# Patient Record
Sex: FEMALE | Race: WHITE | Employment: FULL TIME | URBAN - METROPOLITAN AREA
[De-identification: names, ages, dates, MRNs, and addresses within clinical notes are randomized per-mention and may not be internally consistent; named-entity substitution may affect disease eponyms.]

---

## 2022-08-01 PROBLEM — S00.83XA FACIAL CONTUSION, INITIAL ENCOUNTER: Status: ACTIVE | Noted: 2022-08-01

## 2022-08-01 PROBLEM — S00.81XA FACIAL ABRASION, INITIAL ENCOUNTER: Status: ACTIVE | Noted: 2022-08-01

## 2022-08-01 PROBLEM — R42 VERTIGO: Status: ACTIVE | Noted: 2022-08-01

## 2022-08-01 PROBLEM — S02.30XA ORBITAL FLOOR (BLOW-OUT) CLOSED FRACTURE (HCC): Status: ACTIVE | Noted: 2022-08-01

## 2022-10-19 ENCOUNTER — HOSPITAL ENCOUNTER (OUTPATIENT)
Dept: GENERAL RADIOLOGY | Age: 57
Discharge: HOME | End: 2022-10-19
Payer: COMMERCIAL

## 2022-10-19 DIAGNOSIS — N30.01 ACUTE CYSTITIS WITH HEMATURIA: ICD-10-CM

## 2022-10-19 PROCEDURE — 74018 RADEX ABDOMEN 1 VIEW: CPT

## 2022-11-22 ENCOUNTER — HOSPITAL ENCOUNTER (OUTPATIENT)
Dept: GENERAL RADIOLOGY | Age: 57
Discharge: HOME | End: 2022-11-22
Payer: COMMERCIAL

## 2022-11-22 DIAGNOSIS — N20.0 CALCULUS OF KIDNEY: ICD-10-CM

## 2022-11-22 PROCEDURE — 74018 RADEX ABDOMEN 1 VIEW: CPT

## 2022-12-06 ENCOUNTER — HOSPITAL ENCOUNTER (OUTPATIENT)
Dept: CT IMAGING | Age: 57
Discharge: HOME | End: 2022-12-06
Attending: PHYSICIAN ASSISTANT
Payer: COMMERCIAL

## 2022-12-06 DIAGNOSIS — R10.9 LEFT FLANK PAIN: ICD-10-CM

## 2022-12-06 DIAGNOSIS — N20.0 KIDNEY STONE: ICD-10-CM

## 2022-12-06 PROCEDURE — 74176 CT ABD & PELVIS W/O CONTRAST: CPT

## 2022-12-06 NOTE — PROGRESS NOTES
Ct with nonobstructive L kidney stones. No obstructive stones. Will discuss at follow up. It also shows mild diverticulitis. Sent rx for augmentin three times daily x 5 days to pharmacy on file. Please make sure she takes a probiotic while taking and try to limit to liquid or low fiber diet. Make sure she makes an appointment with her pcp for follow up on her diverticulitis. Thank you.